# Patient Record
Sex: FEMALE | Race: WHITE | Employment: UNEMPLOYED | URBAN - METROPOLITAN AREA
[De-identification: names, ages, dates, MRNs, and addresses within clinical notes are randomized per-mention and may not be internally consistent; named-entity substitution may affect disease eponyms.]

---

## 2018-10-24 ENCOUNTER — HOSPITAL ENCOUNTER (OUTPATIENT)
Age: 63
Discharge: HOME | DRG: 001 | End: 2018-10-24
Payer: MEDICARE

## 2018-10-24 LAB
ANION GAP SERPL CALCULATED.3IONS-SCNC: 13 MMOL/L (ref 4–16)
APTT: 29.6 SECONDS (ref 21.2–33)
BASOPHILS ABSOLUTE: 0.1 K/CU MM
BASOPHILS RELATIVE PERCENT: 0.4 % (ref 0–1)
BUN BLDV-MCNC: 25 MG/DL (ref 6–23)
CALCIUM SERPL-MCNC: 8.9 MG/DL (ref 8.3–10.6)
CHLORIDE BLD-SCNC: 98 MMOL/L (ref 99–110)
CO2: 20 MMOL/L (ref 21–32)
CREAT SERPL-MCNC: 0.9 MG/DL (ref 0.6–1.1)
DIFFERENTIAL TYPE: ABNORMAL
EOSINOPHILS ABSOLUTE: 0.1 K/CU MM
EOSINOPHILS RELATIVE PERCENT: 1.2 % (ref 0–3)
GFR AFRICAN AMERICAN: >60 ML/MIN/1.73M2
GFR NON-AFRICAN AMERICAN: >60 ML/MIN/1.73M2
GLUCOSE BLD-MCNC: 422 MG/DL (ref 70–99)
HCT VFR BLD CALC: 32.7 % (ref 37–47)
HEMOGLOBIN: 9.9 GM/DL (ref 12.5–16)
IMMATURE NEUTROPHIL %: 0.3 % (ref 0–0.43)
INR BLD: 0.98 INDEX
LYMPHOCYTES ABSOLUTE: 1.3 K/CU MM
LYMPHOCYTES RELATIVE PERCENT: 11.2 % (ref 24–44)
MCH RBC QN AUTO: 28.2 PG (ref 27–31)
MCHC RBC AUTO-ENTMCNC: 30.3 % (ref 32–36)
MCV RBC AUTO: 93.2 FL (ref 78–100)
MONOCYTES ABSOLUTE: 0.4 K/CU MM
MONOCYTES RELATIVE PERCENT: 3.1 % (ref 0–4)
NUCLEATED RBC %: 0 %
PDW BLD-RTO: 13.7 % (ref 11.7–14.9)
PLATELET # BLD: 295 K/CU MM (ref 140–440)
PMV BLD AUTO: 9.9 FL (ref 7.5–11.1)
POTASSIUM SERPL-SCNC: 5.2 MMOL/L (ref 3.5–5.1)
PROTHROMBIN TIME: 11.2 SECONDS (ref 9.12–12.5)
RBC # BLD: 3.51 M/CU MM (ref 4.2–5.4)
SEGMENTED NEUTROPHILS ABSOLUTE COUNT: 9.6 K/CU MM
SEGMENTED NEUTROPHILS RELATIVE PERCENT: 83.8 % (ref 36–66)
SODIUM BLD-SCNC: 131 MMOL/L (ref 135–145)
TOTAL IMMATURE NEUTOROPHIL: 0.03 K/CU MM
TOTAL NUCLEATED RBC: 0 K/CU MM
WBC # BLD: 11.5 K/CU MM (ref 4–10.5)

## 2018-10-24 PROCEDURE — 85025 COMPLETE CBC W/AUTO DIFF WBC: CPT

## 2018-10-24 PROCEDURE — 85610 PROTHROMBIN TIME: CPT

## 2018-10-24 PROCEDURE — 36415 COLL VENOUS BLD VENIPUNCTURE: CPT

## 2018-10-24 PROCEDURE — 85730 THROMBOPLASTIN TIME PARTIAL: CPT

## 2018-10-24 PROCEDURE — 80048 BASIC METABOLIC PNL TOTAL CA: CPT

## 2018-10-25 ENCOUNTER — APPOINTMENT (OUTPATIENT)
Dept: ULTRASOUND IMAGING | Age: 63
DRG: 001 | End: 2018-10-25
Attending: INTERNAL MEDICINE
Payer: MEDICARE

## 2018-10-25 ENCOUNTER — APPOINTMENT (OUTPATIENT)
Dept: GENERAL RADIOLOGY | Age: 63
DRG: 001 | End: 2018-10-25
Attending: INTERNAL MEDICINE
Payer: MEDICARE

## 2018-10-25 PROBLEM — I25.10 CAD IN NATIVE ARTERY: Status: ACTIVE | Noted: 2018-10-25

## 2018-10-25 PROBLEM — I25.10 3-VESSEL CAD: Status: ACTIVE | Noted: 2018-10-25

## 2018-10-25 PROCEDURE — 93880 EXTRACRANIAL BILAT STUDY: CPT

## 2018-10-25 PROCEDURE — 71045 X-RAY EXAM CHEST 1 VIEW: CPT

## 2018-10-25 PROCEDURE — 93971 EXTREMITY STUDY: CPT

## 2018-10-26 ENCOUNTER — APPOINTMENT (OUTPATIENT)
Dept: GENERAL RADIOLOGY | Age: 63
DRG: 001 | End: 2018-10-26
Attending: INTERNAL MEDICINE
Payer: MEDICARE

## 2018-10-26 ENCOUNTER — APPOINTMENT (OUTPATIENT)
Dept: CT IMAGING | Age: 63
DRG: 001 | End: 2018-10-26
Attending: INTERNAL MEDICINE
Payer: MEDICARE

## 2018-10-26 PROCEDURE — 71250 CT THORAX DX C-: CPT

## 2018-10-26 PROCEDURE — 71045 X-RAY EXAM CHEST 1 VIEW: CPT

## 2018-10-28 ENCOUNTER — APPOINTMENT (OUTPATIENT)
Dept: GENERAL RADIOLOGY | Age: 63
DRG: 001 | End: 2018-10-28
Attending: INTERNAL MEDICINE
Payer: MEDICARE

## 2018-10-28 ENCOUNTER — ANESTHESIA EVENT (OUTPATIENT)
Dept: OPERATING ROOM | Age: 63
DRG: 001 | End: 2018-10-28
Payer: MEDICARE

## 2018-10-28 PROCEDURE — 71045 X-RAY EXAM CHEST 1 VIEW: CPT

## 2018-10-28 NOTE — ANESTHESIA PRE PROCEDURE
10/28/18 [address] rectal enema 1 enema  1 enema Rectal Once Angel Cruz MD        insulin lispro (HUMALOG) injection vial 0-6 Units  0-6 Units Subcutaneous TID WC Angel Cruz MD   Stopped at 10/28/18 1647    linagliptin (TRADJENTA) tablet 5 mg  5 mg Oral Daily Jovnaa Cabello MD   5 mg at 10/28/18 6107    insulin glargine (LANTUS) injection vial 20 Units  20 Units Subcutaneous Nightly Jovana Cabello MD   20 Units at 10/27/18 2131    insulin lispro (HUMALOG) injection vial 0-6 Units  0-6 Units Subcutaneous Nightly Jovana Cabello MD   3 Units at 10/27/18 2130       Allergies:  No Known Allergies    Problem List:    Patient Active Problem List   Diagnosis Code    Confusion with non-focal neuro exam R41.0    Diabetes mellitus ([facility] E11.9    Abscess, jaw M27.2    Hyperlipidemia E78.5    Hypertension I10    Neuropathy in diabetes ([facility] E11.40    Speech abnormality R47.9    Depression F32.9    Anxiety F41.9    TIA (transient ischemic attack) G45.9    3-vessel CAD I25.10    CAD in native artery I25.10       Past Medical History:        Diagnosis Date    Diabetes mellitus ([facility]     Hypertension     TIA (transient ischemic attack)        Past Surgical History:        Procedure Laterality Date    HYSTERECTOMY      TUMOR EXCISION      abdomen       Social History:    Social History   Substance Use Topics    Smoking status: Never Smoker    Smokeless tobacco: Not on file    Alcohol use No                                Counseling given: Not Answered      Vital Signs (Current):   Vitals:    10/28/18 1602 10/28/18 1702 10/28/18 1802 10/28/18 1821   BP: 99/68 110/77 (!) 97/59    Pulse: 98 99 98 96   Resp: 19 20 17 21   Temp: 36.7 °C (98.1 °F)      TempSrc:       SpO2: 94% 94% 92% 90%   Weight:       Height:                                                  BP Readings from Last 3 Encounters:   10/28/18 (!) 97/59   09/15/16 109/67   12/11/15 119/63       NPO Status: Time of last

## 2018-10-29 ENCOUNTER — APPOINTMENT (OUTPATIENT)
Dept: GENERAL RADIOLOGY | Age: 63
DRG: 001 | End: 2018-10-29
Attending: INTERNAL MEDICINE
Payer: MEDICARE

## 2018-10-29 ENCOUNTER — ANESTHESIA (OUTPATIENT)
Dept: OPERATING ROOM | Age: 63
DRG: 001 | End: 2018-10-29
Payer: MEDICARE

## 2018-10-29 VITALS
OXYGEN SATURATION: 100 % | DIASTOLIC BLOOD PRESSURE: 74 MMHG | RESPIRATION RATE: 12 BRPM | SYSTOLIC BLOOD PRESSURE: 109 MMHG

## 2018-10-29 PROCEDURE — 36556 INSERT NON-TUNNEL CV CATH: CPT | Performed by: ANESTHESIOLOGY

## 2018-10-29 PROCEDURE — 71045 X-RAY EXAM CHEST 1 VIEW: CPT

## 2018-10-29 PROCEDURE — 2580000003 HC RX 258: Performed by: NURSE ANESTHETIST, CERTIFIED REGISTERED

## 2018-10-29 PROCEDURE — 2500000003 HC RX 250 WO HCPCS: Performed by: SURGERY

## 2018-10-29 PROCEDURE — 36620 INSERTION CATHETER ARTERY: CPT | Performed by: ANESTHESIOLOGY

## 2018-10-29 PROCEDURE — P9045 ALBUMIN (HUMAN), 5%, 250 ML: HCPCS | Performed by: NURSE ANESTHETIST, CERTIFIED REGISTERED

## 2018-10-29 PROCEDURE — 6360000002 HC RX W HCPCS: Performed by: NURSE ANESTHETIST, CERTIFIED REGISTERED

## 2018-10-29 PROCEDURE — 2500000003 HC RX 250 WO HCPCS: Performed by: NURSE ANESTHETIST, CERTIFIED REGISTERED

## 2018-10-29 PROCEDURE — 2580000003 HC RX 258: Performed by: SURGERY

## 2018-10-29 PROCEDURE — 93503 INSERT/PLACE HEART CATHETER: CPT | Performed by: NURSE ANESTHETIST, CERTIFIED REGISTERED

## 2018-10-29 PROCEDURE — 6370000000 HC RX 637 (ALT 250 FOR IP): Performed by: SURGERY

## 2018-10-29 RX ORDER — SUCCINYLCHOLINE CHLORIDE 20 MG/ML
INJECTION INTRAMUSCULAR; INTRAVENOUS PRN
Status: DISCONTINUED | OUTPATIENT
Start: 2018-10-29 | End: 2018-10-29 | Stop reason: SDUPTHER

## 2018-10-29 RX ORDER — EPINEPHRINE 1 MG/ML
INJECTION, SOLUTION, CONCENTRATE INTRAVENOUS PRN
Status: DISCONTINUED | OUTPATIENT
Start: 2018-10-29 | End: 2018-10-29 | Stop reason: SDUPTHER

## 2018-10-29 RX ORDER — HEPARIN SODIUM 10000 [USP'U]/ML
INJECTION, SOLUTION INTRAVENOUS; SUBCUTANEOUS PRN
Status: DISCONTINUED | OUTPATIENT
Start: 2018-10-29 | End: 2018-10-29 | Stop reason: SDUPTHER

## 2018-10-29 RX ORDER — PROTAMINE SULFATE 10 MG/ML
INJECTION, SOLUTION INTRAVENOUS PRN
Status: DISCONTINUED | OUTPATIENT
Start: 2018-10-29 | End: 2018-10-29 | Stop reason: SDUPTHER

## 2018-10-29 RX ORDER — DIPHENHYDRAMINE HYDROCHLORIDE 50 MG/ML
INJECTION INTRAMUSCULAR; INTRAVENOUS PRN
Status: DISCONTINUED | OUTPATIENT
Start: 2018-10-29 | End: 2018-10-29 | Stop reason: SDUPTHER

## 2018-10-29 RX ORDER — CEFAZOLIN SODIUM 1 G/3ML
INJECTION, POWDER, FOR SOLUTION INTRAMUSCULAR; INTRAVENOUS PRN
Status: DISCONTINUED | OUTPATIENT
Start: 2018-10-29 | End: 2018-10-29 | Stop reason: SDUPTHER

## 2018-10-29 RX ORDER — SODIUM CHLORIDE, SODIUM LACTATE, POTASSIUM CHLORIDE, CALCIUM CHLORIDE 600; 310; 30; 20 MG/100ML; MG/100ML; MG/100ML; MG/100ML
INJECTION, SOLUTION INTRAVENOUS CONTINUOUS PRN
Status: DISCONTINUED | OUTPATIENT
Start: 2018-10-29 | End: 2018-10-29 | Stop reason: SDUPTHER

## 2018-10-29 RX ORDER — DEXAMETHASONE SODIUM PHOSPHATE 4 MG/ML
INJECTION, SOLUTION INTRA-ARTICULAR; INTRALESIONAL; INTRAMUSCULAR; INTRAVENOUS; SOFT TISSUE PRN
Status: DISCONTINUED | OUTPATIENT
Start: 2018-10-29 | End: 2018-10-29 | Stop reason: SDUPTHER

## 2018-10-29 RX ORDER — CALCIUM CHLORIDE 100 MG/ML
INJECTION INTRAVENOUS; INTRAVENTRICULAR PRN
Status: DISCONTINUED | OUTPATIENT
Start: 2018-10-29 | End: 2018-10-29 | Stop reason: ALTCHOICE

## 2018-10-29 RX ORDER — ALBUMIN, HUMAN INJ 5% 5 %
SOLUTION INTRAVENOUS PRN
Status: DISCONTINUED | OUTPATIENT
Start: 2018-10-29 | End: 2018-10-29 | Stop reason: SDUPTHER

## 2018-10-29 RX ORDER — LIDOCAINE HYDROCHLORIDE 20 MG/ML
INJECTION, SOLUTION INFILTRATION; PERINEURAL PRN
Status: DISCONTINUED | OUTPATIENT
Start: 2018-10-29 | End: 2018-10-29 | Stop reason: SDUPTHER

## 2018-10-29 RX ORDER — HYDROMORPHONE HCL 110MG/55ML
PATIENT CONTROLLED ANALGESIA SYRINGE INTRAVENOUS PRN
Status: DISCONTINUED | OUTPATIENT
Start: 2018-10-29 | End: 2018-10-29 | Stop reason: SDUPTHER

## 2018-10-29 RX ORDER — SODIUM CHLORIDE 9 MG/ML
INJECTION, SOLUTION INTRAVENOUS CONTINUOUS PRN
Status: DISCONTINUED | OUTPATIENT
Start: 2018-10-29 | End: 2018-10-29 | Stop reason: SDUPTHER

## 2018-10-29 RX ORDER — NEOSTIGMINE METHYLSULFATE 5 MG/5 ML
SYRINGE (ML) INTRAVENOUS PRN
Status: DISCONTINUED | OUTPATIENT
Start: 2018-10-29 | End: 2018-10-29 | Stop reason: SDUPTHER

## 2018-10-29 RX ORDER — ONDANSETRON 2 MG/ML
INJECTION INTRAMUSCULAR; INTRAVENOUS PRN
Status: DISCONTINUED | OUTPATIENT
Start: 2018-10-29 | End: 2018-10-29 | Stop reason: SDUPTHER

## 2018-10-29 RX ORDER — PROPOFOL 10 MG/ML
INJECTION, EMULSION INTRAVENOUS PRN
Status: DISCONTINUED | OUTPATIENT
Start: 2018-10-29 | End: 2018-10-29 | Stop reason: SDUPTHER

## 2018-10-29 RX ORDER — SUFENTANIL CITRATE 50 UG/ML
INJECTION EPIDURAL; INTRAVENOUS PRN
Status: DISCONTINUED | OUTPATIENT
Start: 2018-10-29 | End: 2018-10-29 | Stop reason: SDUPTHER

## 2018-10-29 RX ORDER — POTASSIUM CHLORIDE 29.8 MG/ML
INJECTION INTRAVENOUS PRN
Status: DISCONTINUED | OUTPATIENT
Start: 2018-10-29 | End: 2018-10-29 | Stop reason: SDUPTHER

## 2018-10-29 RX ORDER — VASOPRESSIN 20 U/ML
INJECTION PARENTERAL PRN
Status: DISCONTINUED | OUTPATIENT
Start: 2018-10-29 | End: 2018-10-29 | Stop reason: SDUPTHER

## 2018-10-29 RX ORDER — VECURONIUM BROMIDE 1 MG/ML
INJECTION, POWDER, LYOPHILIZED, FOR SOLUTION INTRAVENOUS PRN
Status: DISCONTINUED | OUTPATIENT
Start: 2018-10-29 | End: 2018-10-29 | Stop reason: SDUPTHER

## 2018-10-29 RX ORDER — GLYCOPYRROLATE 0.2 MG/ML
INJECTION INTRAMUSCULAR; INTRAVENOUS PRN
Status: DISCONTINUED | OUTPATIENT
Start: 2018-10-29 | End: 2018-10-29 | Stop reason: SDUPTHER

## 2018-10-29 RX ADMIN — SUFENTANIL CITRATE 10 MCG: 50 INJECTION EPIDURAL; INTRAVENOUS at 07:40

## 2018-10-29 RX ADMIN — EPINEPHRINE 10 MCG: 1 INJECTION, SOLUTION INTRAMUSCULAR; SUBCUTANEOUS at 08:36

## 2018-10-29 RX ADMIN — VASOPRESSIN 1 UNITS: 20 INJECTION INTRAVENOUS at 08:08

## 2018-10-29 RX ADMIN — ONDANSETRON HYDROCHLORIDE 4 MG: 2 SOLUTION INTRAMUSCULAR; INTRAVENOUS at 07:40

## 2018-10-29 RX ADMIN — ALBUMIN (HUMAN) 250 ML: 0.05 SOLUTION INTRAVENOUS at 08:15

## 2018-10-29 RX ADMIN — SUFENTANIL CITRATE 10 MCG: 50 INJECTION EPIDURAL; INTRAVENOUS at 08:30

## 2018-10-29 RX ADMIN — GLYCOPYRROLATE 0.4 MG: 0.2 INJECTION, SOLUTION INTRAMUSCULAR; INTRAVENOUS at 11:32

## 2018-10-29 RX ADMIN — PROPOFOL 30 MG: 10 INJECTION, EMULSION INTRAVENOUS at 07:31

## 2018-10-29 RX ADMIN — PROTAMINE SULFATE 150 MG: 10 INJECTION, SOLUTION INTRAVENOUS at 10:55

## 2018-10-29 RX ADMIN — LIDOCAINE HYDROCHLORIDE 100 MG: 20 INJECTION, SOLUTION INFILTRATION; PERINEURAL at 07:31

## 2018-10-29 RX ADMIN — EPINEPHRINE 10 MCG: 1 INJECTION, SOLUTION INTRAMUSCULAR; SUBCUTANEOUS at 08:28

## 2018-10-29 RX ADMIN — SUFENTANIL CITRATE 40 MCG: 50 INJECTION EPIDURAL; INTRAVENOUS at 07:31

## 2018-10-29 RX ADMIN — PROTAMINE SULFATE 150 MG: 10 INJECTION, SOLUTION INTRAVENOUS at 10:45

## 2018-10-29 RX ADMIN — EPINEPHRINE 10 MCG: 1 INJECTION, SOLUTION INTRAMUSCULAR; SUBCUTANEOUS at 08:26

## 2018-10-29 RX ADMIN — SUFENTANIL CITRATE 10 MCG: 50 INJECTION EPIDURAL; INTRAVENOUS at 08:40

## 2018-10-29 RX ADMIN — VECURONIUM BROMIDE FOR INJECTION 2 MG: 1 INJECTION, POWDER, LYOPHILIZED, FOR SOLUTION INTRAVENOUS at 08:35

## 2018-10-29 RX ADMIN — HEPARIN SODIUM 5000 UNITS: 10000 INJECTION, SOLUTION INTRAVENOUS; SUBCUTANEOUS at 08:36

## 2018-10-29 RX ADMIN — Medication 50 MEQ: at 11:40

## 2018-10-29 RX ADMIN — POTASSIUM CHLORIDE 40 MEQ: 400 INJECTION, SOLUTION INTRAVENOUS at 11:15

## 2018-10-29 RX ADMIN — EPINEPHRINE 10 MCG: 1 INJECTION, SOLUTION INTRAMUSCULAR; SUBCUTANEOUS at 08:38

## 2018-10-29 RX ADMIN — CALCIUM CHLORIDE 1 G: 100 INJECTION, SOLUTION INTRAVENOUS; INTRAVENTRICULAR at 11:30

## 2018-10-29 RX ADMIN — EPINEPHRINE 20 MCG: 1 INJECTION, SOLUTION INTRAMUSCULAR; SUBCUTANEOUS at 07:50

## 2018-10-29 RX ADMIN — ALBUMIN (HUMAN) 250 ML: 0.05 SOLUTION INTRAVENOUS at 09:00

## 2018-10-29 RX ADMIN — SODIUM CHLORIDE 12 UNITS/HR: 9 INJECTION, SOLUTION INTRAVENOUS at 09:08

## 2018-10-29 RX ADMIN — VASOPRESSIN 2 UNITS: 20 INJECTION INTRAVENOUS at 07:45

## 2018-10-29 RX ADMIN — SUFENTANIL CITRATE 10 MCG: 50 INJECTION EPIDURAL; INTRAVENOUS at 08:21

## 2018-10-29 RX ADMIN — DEXAMETHASONE SODIUM PHOSPHATE 12 MG: 4 INJECTION, SOLUTION INTRAMUSCULAR; INTRAVENOUS at 07:40

## 2018-10-29 RX ADMIN — EPINEPHRINE 20 MCG: 1 INJECTION, SOLUTION INTRAMUSCULAR; SUBCUTANEOUS at 07:37

## 2018-10-29 RX ADMIN — HEPARIN SODIUM 25000 UNITS: 10000 INJECTION, SOLUTION INTRAVENOUS; SUBCUTANEOUS at 08:31

## 2018-10-29 RX ADMIN — EPINEPHRINE 20 MCG: 1 INJECTION, SOLUTION INTRAMUSCULAR; SUBCUTANEOUS at 07:45

## 2018-10-29 RX ADMIN — VASOPRESSIN 1 UNITS: 20 INJECTION INTRAVENOUS at 07:55

## 2018-10-29 RX ADMIN — EPINEPHRINE 5 MCG/MIN: 1 INJECTION, SOLUTION, CONCENTRATE INTRAVENOUS at 07:36

## 2018-10-29 RX ADMIN — HYDROMORPHONE HYDROCHLORIDE 0.4 MG: 2 INJECTION INTRAMUSCULAR; INTRAVENOUS; SUBCUTANEOUS at 11:04

## 2018-10-29 RX ADMIN — SUFENTANIL CITRATE 10 MCG: 50 INJECTION EPIDURAL; INTRAVENOUS at 08:15

## 2018-10-29 RX ADMIN — Medication 50 MEQ: at 11:00

## 2018-10-29 RX ADMIN — SODIUM CHLORIDE, POTASSIUM CHLORIDE, SODIUM LACTATE AND CALCIUM CHLORIDE: 600; 310; 30; 20 INJECTION, SOLUTION INTRAVENOUS at 09:05

## 2018-10-29 RX ADMIN — VASOPRESSIN 1 UNITS: 20 INJECTION INTRAVENOUS at 08:28

## 2018-10-29 RX ADMIN — DIPHENHYDRAMINE HYDROCHLORIDE 50 MG: 50 INJECTION INTRAMUSCULAR; INTRAVENOUS at 08:36

## 2018-10-29 RX ADMIN — NOREPINEPHRINE BITARTRATE 5 MCG/MIN: 1 INJECTION, SOLUTION, CONCENTRATE INTRAVENOUS at 10:28

## 2018-10-29 RX ADMIN — SUCCINYLCHOLINE CHLORIDE 200 MG: 20 INJECTION, SOLUTION INTRAMUSCULAR; INTRAVENOUS at 07:32

## 2018-10-29 RX ADMIN — SUFENTANIL CITRATE 10 MCG: 50 INJECTION EPIDURAL; INTRAVENOUS at 08:10

## 2018-10-29 RX ADMIN — CALCIUM CHLORIDE 1 G: 100 INJECTION, SOLUTION INTRAVENOUS; INTRAVENTRICULAR at 11:00

## 2018-10-29 RX ADMIN — SODIUM CHLORIDE: 9 INJECTION, SOLUTION INTRAVENOUS at 06:59

## 2018-10-29 RX ADMIN — HYDROMORPHONE HYDROCHLORIDE 0.6 MG: 2 INJECTION INTRAMUSCULAR; INTRAVENOUS; SUBCUTANEOUS at 11:36

## 2018-10-29 RX ADMIN — GLYCOPYRROLATE 0.2 MG: 0.2 INJECTION, SOLUTION INTRAMUSCULAR; INTRAVENOUS at 11:33

## 2018-10-29 RX ADMIN — VECURONIUM BROMIDE FOR INJECTION 4 MG: 1 INJECTION, POWDER, LYOPHILIZED, FOR SOLUTION INTRAVENOUS at 08:25

## 2018-10-29 RX ADMIN — SODIUM CHLORIDE, POTASSIUM CHLORIDE, SODIUM LACTATE AND CALCIUM CHLORIDE: 600; 310; 30; 20 INJECTION, SOLUTION INTRAVENOUS at 06:59

## 2018-10-29 RX ADMIN — Medication 3 MG: at 11:32

## 2018-10-29 RX ADMIN — EPINEPHRINE 10 MCG: 1 INJECTION, SOLUTION INTRAMUSCULAR; SUBCUTANEOUS at 08:08

## 2018-10-29 RX ADMIN — EPINEPHRINE 10 MCG: 1 INJECTION, SOLUTION INTRAMUSCULAR; SUBCUTANEOUS at 07:55

## 2018-10-29 RX ADMIN — CEFAZOLIN 2000 MG: 1 INJECTION, POWDER, FOR SOLUTION INTRAMUSCULAR; INTRAVENOUS; PARENTERAL at 07:35

## 2018-10-29 RX ADMIN — VECURONIUM BROMIDE FOR INJECTION 4 MG: 1 INJECTION, POWDER, LYOPHILIZED, FOR SOLUTION INTRAVENOUS at 07:35

## 2018-10-29 RX ADMIN — SODIUM CHLORIDE 8 UNITS/HR: 9 INJECTION, SOLUTION INTRAVENOUS at 07:57

## 2018-10-29 ASSESSMENT — PULMONARY FUNCTION TESTS
PIF_VALUE: 0
PIF_VALUE: 24
PIF_VALUE: 24
PIF_VALUE: 0
PIF_VALUE: 0
PIF_VALUE: 30
PIF_VALUE: 3
PIF_VALUE: 0
PIF_VALUE: 58
PIF_VALUE: 0
PIF_VALUE: 29
PIF_VALUE: 26
PIF_VALUE: 0
PIF_VALUE: 30
PIF_VALUE: 20
PIF_VALUE: 25
PIF_VALUE: 30
PIF_VALUE: 27
PIF_VALUE: 0
PIF_VALUE: 30
PIF_VALUE: 30
PIF_VALUE: 0
PIF_VALUE: 0
PIF_VALUE: 29
PIF_VALUE: 0
PIF_VALUE: 24
PIF_VALUE: 25
PIF_VALUE: 22
PIF_VALUE: 29
PIF_VALUE: 27
PIF_VALUE: 0
PIF_VALUE: 0
PIF_VALUE: 29
PIF_VALUE: 22
PIF_VALUE: 22
PIF_VALUE: 26
PIF_VALUE: 0
PIF_VALUE: 24
PIF_VALUE: 22
PIF_VALUE: 27
PIF_VALUE: 0
PIF_VALUE: 2
PIF_VALUE: 27
PIF_VALUE: 0
PIF_VALUE: 26
PIF_VALUE: 23
PIF_VALUE: 0
PIF_VALUE: 0
PIF_VALUE: 29
PIF_VALUE: 26
PIF_VALUE: 28
PIF_VALUE: 0
PIF_VALUE: 24
PIF_VALUE: 23
PIF_VALUE: 0
PIF_VALUE: 29
PIF_VALUE: 0
PIF_VALUE: 27
PIF_VALUE: 0
PIF_VALUE: 24
PIF_VALUE: 0
PIF_VALUE: 30
PIF_VALUE: 0
PIF_VALUE: 28
PIF_VALUE: 0
PIF_VALUE: 0
PIF_VALUE: 29
PIF_VALUE: 0
PIF_VALUE: 27
PIF_VALUE: 0
PIF_VALUE: 29
PIF_VALUE: 23
PIF_VALUE: 0
PIF_VALUE: 3
PIF_VALUE: 0
PIF_VALUE: 28
PIF_VALUE: 30
PIF_VALUE: 23
PIF_VALUE: 22
PIF_VALUE: 22
PIF_VALUE: 0
PIF_VALUE: 25
PIF_VALUE: 0
PIF_VALUE: 21
PIF_VALUE: 32
PIF_VALUE: 28
PIF_VALUE: 0
PIF_VALUE: 4
PIF_VALUE: 0
PIF_VALUE: 0
PIF_VALUE: 29
PIF_VALUE: 0
PIF_VALUE: 23
PIF_VALUE: 0
PIF_VALUE: 22
PIF_VALUE: 0
PIF_VALUE: 25
PIF_VALUE: 25
PIF_VALUE: 27
PIF_VALUE: 24
PIF_VALUE: 0
PIF_VALUE: 4
PIF_VALUE: 24
PIF_VALUE: 0
PIF_VALUE: 0
PIF_VALUE: 25
PIF_VALUE: 0
PIF_VALUE: 0
PIF_VALUE: 29
PIF_VALUE: 26
PIF_VALUE: 0
PIF_VALUE: 0
PIF_VALUE: 23
PIF_VALUE: 25
PIF_VALUE: 25
PIF_VALUE: 29
PIF_VALUE: 0
PIF_VALUE: 0
PIF_VALUE: 26
PIF_VALUE: 24
PIF_VALUE: 0
PIF_VALUE: 0
PIF_VALUE: 29
PIF_VALUE: 0
PIF_VALUE: 26
PIF_VALUE: 25
PIF_VALUE: 1
PIF_VALUE: 28
PIF_VALUE: 0
PIF_VALUE: 2
PIF_VALUE: 0
PIF_VALUE: 26
PIF_VALUE: 24
PIF_VALUE: 30
PIF_VALUE: 0
PIF_VALUE: 22
PIF_VALUE: 28
PIF_VALUE: 29
PIF_VALUE: 35
PIF_VALUE: 31
PIF_VALUE: 1
PIF_VALUE: 23
PIF_VALUE: 28
PIF_VALUE: 30
PIF_VALUE: 0
PIF_VALUE: 28
PIF_VALUE: 0
PIF_VALUE: 26
PIF_VALUE: 0
PIF_VALUE: 26
PIF_VALUE: 23
PIF_VALUE: 26
PIF_VALUE: 0
PIF_VALUE: 23
PIF_VALUE: 26
PIF_VALUE: 26
PIF_VALUE: 23
PIF_VALUE: 26
PIF_VALUE: 27
PIF_VALUE: 28
PIF_VALUE: 29
PIF_VALUE: 24
PIF_VALUE: 0
PIF_VALUE: 2
PIF_VALUE: 0
PIF_VALUE: 24
PIF_VALUE: 28
PIF_VALUE: 28
PIF_VALUE: 0
PIF_VALUE: 30
PIF_VALUE: 0
PIF_VALUE: 12
PIF_VALUE: 28
PIF_VALUE: 12
PIF_VALUE: 0
PIF_VALUE: 7
PIF_VALUE: 0
PIF_VALUE: 27
PIF_VALUE: 0
PIF_VALUE: 1
PIF_VALUE: 29
PIF_VALUE: 0
PIF_VALUE: 24
PIF_VALUE: 31
PIF_VALUE: 7
PIF_VALUE: 22
PIF_VALUE: 27
PIF_VALUE: 0
PIF_VALUE: 22
PIF_VALUE: 0
PIF_VALUE: 26
PIF_VALUE: 0
PIF_VALUE: 0
PIF_VALUE: 26
PIF_VALUE: 0
PIF_VALUE: 26
PIF_VALUE: 0
PIF_VALUE: 8
PIF_VALUE: 0
PIF_VALUE: 26
PIF_VALUE: 0
PIF_VALUE: 0
PIF_VALUE: 26
PIF_VALUE: 0
PIF_VALUE: 26
PIF_VALUE: 25
PIF_VALUE: 28
PIF_VALUE: 24
PIF_VALUE: 1
PIF_VALUE: 0
PIF_VALUE: 29
PIF_VALUE: 0
PIF_VALUE: 25
PIF_VALUE: 27
PIF_VALUE: 27
PIF_VALUE: 0
PIF_VALUE: 0
PIF_VALUE: 21
PIF_VALUE: 0
PIF_VALUE: 0
PIF_VALUE: 21
PIF_VALUE: 0
PIF_VALUE: 25
PIF_VALUE: 30
PIF_VALUE: 10
PIF_VALUE: 24
PIF_VALUE: 0
PIF_VALUE: 50
PIF_VALUE: 24
PIF_VALUE: 0
PIF_VALUE: 28
PIF_VALUE: 0
PIF_VALUE: 25
PIF_VALUE: 23
PIF_VALUE: 26
PIF_VALUE: 0
PIF_VALUE: 24
PIF_VALUE: 29
PIF_VALUE: 26
PIF_VALUE: 29
PIF_VALUE: 27
PIF_VALUE: 2
PIF_VALUE: 0
PIF_VALUE: 10

## 2018-10-30 ENCOUNTER — APPOINTMENT (OUTPATIENT)
Dept: GENERAL RADIOLOGY | Age: 63
DRG: 001 | End: 2018-10-30
Attending: INTERNAL MEDICINE
Payer: MEDICARE

## 2018-10-30 ENCOUNTER — ANESTHESIA (OUTPATIENT)
Dept: OPERATING ROOM | Age: 63
DRG: 001 | End: 2018-10-30
Payer: MEDICARE

## 2018-10-30 ENCOUNTER — ANESTHESIA EVENT (OUTPATIENT)
Dept: OPERATING ROOM | Age: 63
DRG: 001 | End: 2018-10-30
Payer: MEDICARE

## 2018-10-30 VITALS
DIASTOLIC BLOOD PRESSURE: 13 MMHG | TEMPERATURE: 97.7 F | RESPIRATION RATE: 10 BRPM | SYSTOLIC BLOOD PRESSURE: 74 MMHG | OXYGEN SATURATION: 93 %

## 2018-10-30 PROCEDURE — 2580000003 HC RX 258: Performed by: NURSE ANESTHETIST, CERTIFIED REGISTERED

## 2018-10-30 PROCEDURE — 6360000002 HC RX W HCPCS: Performed by: NURSE ANESTHETIST, CERTIFIED REGISTERED

## 2018-10-30 PROCEDURE — 6370000000 HC RX 637 (ALT 250 FOR IP): Performed by: NURSE ANESTHETIST, CERTIFIED REGISTERED

## 2018-10-30 PROCEDURE — 76000 FLUOROSCOPY <1 HR PHYS/QHP: CPT

## 2018-10-30 PROCEDURE — 71045 X-RAY EXAM CHEST 1 VIEW: CPT

## 2018-10-30 PROCEDURE — 6360000002 HC RX W HCPCS: Performed by: THORACIC SURGERY (CARDIOTHORACIC VASCULAR SURGERY)

## 2018-10-30 PROCEDURE — 2500000003 HC RX 250 WO HCPCS: Performed by: NURSE ANESTHETIST, CERTIFIED REGISTERED

## 2018-10-30 RX ORDER — VECURONIUM BROMIDE 1 MG/ML
INJECTION, POWDER, LYOPHILIZED, FOR SOLUTION INTRAVENOUS PRN
Status: DISCONTINUED | OUTPATIENT
Start: 2018-10-30 | End: 2018-10-30 | Stop reason: SDUPTHER

## 2018-10-30 RX ORDER — EPINEPHRINE 1 MG/ML
INJECTION, SOLUTION, CONCENTRATE INTRAVENOUS PRN
Status: DISCONTINUED | OUTPATIENT
Start: 2018-10-30 | End: 2018-10-30 | Stop reason: SDUPTHER

## 2018-10-30 RX ORDER — SODIUM CHLORIDE 9 MG/ML
INJECTION, SOLUTION INTRAVENOUS CONTINUOUS PRN
Status: DISCONTINUED | OUTPATIENT
Start: 2018-10-30 | End: 2018-10-30 | Stop reason: SDUPTHER

## 2018-10-30 RX ORDER — DIPHENHYDRAMINE HYDROCHLORIDE 50 MG/ML
INJECTION INTRAMUSCULAR; INTRAVENOUS PRN
Status: DISCONTINUED | OUTPATIENT
Start: 2018-10-30 | End: 2018-10-30 | Stop reason: SDUPTHER

## 2018-10-30 RX ORDER — CEFAZOLIN SODIUM 1 G/3ML
INJECTION, POWDER, FOR SOLUTION INTRAMUSCULAR; INTRAVENOUS PRN
Status: DISCONTINUED | OUTPATIENT
Start: 2018-10-30 | End: 2018-10-30 | Stop reason: SDUPTHER

## 2018-10-30 RX ORDER — HEPARIN SODIUM 1000 [USP'U]/ML
INJECTION, SOLUTION INTRAVENOUS; SUBCUTANEOUS PRN
Status: DISCONTINUED | OUTPATIENT
Start: 2018-10-30 | End: 2018-10-30 | Stop reason: SDUPTHER

## 2018-10-30 RX ORDER — DEXAMETHASONE SODIUM PHOSPHATE 4 MG/ML
INJECTION, SOLUTION INTRA-ARTICULAR; INTRALESIONAL; INTRAMUSCULAR; INTRAVENOUS; SOFT TISSUE PRN
Status: DISCONTINUED | OUTPATIENT
Start: 2018-10-30 | End: 2018-10-30 | Stop reason: SDUPTHER

## 2018-10-30 RX ORDER — EPINEPHRINE 1 MG/ML
INJECTION, SOLUTION, CONCENTRATE INTRAVENOUS PRN
Status: DISCONTINUED | OUTPATIENT
Start: 2018-10-30 | End: 2018-10-30

## 2018-10-30 RX ORDER — DEXTROSE MONOHYDRATE 25 G/50ML
INJECTION, SOLUTION INTRAVENOUS PRN
Status: DISCONTINUED | OUTPATIENT
Start: 2018-10-30 | End: 2018-10-30 | Stop reason: SDUPTHER

## 2018-10-30 RX ADMIN — CEFAZOLIN 2 MG: 1 INJECTION, POWDER, FOR SOLUTION INTRAMUSCULAR; INTRAVENOUS; PARENTERAL at 11:59

## 2018-10-30 RX ADMIN — VASOPRESSIN 0.04 UNITS/MIN: 20 INJECTION INTRAVENOUS at 12:41

## 2018-10-30 RX ADMIN — EPINEPHRINE 10 MCG: 1 INJECTION, SOLUTION INTRAMUSCULAR; SUBCUTANEOUS at 12:39

## 2018-10-30 RX ADMIN — EPINEPHRINE 10 MCG: 1 INJECTION, SOLUTION INTRAMUSCULAR; SUBCUTANEOUS at 13:27

## 2018-10-30 RX ADMIN — EPINEPHRINE 10 MCG: 1 INJECTION, SOLUTION INTRAMUSCULAR; SUBCUTANEOUS at 13:25

## 2018-10-30 RX ADMIN — DEXAMETHASONE SODIUM PHOSPHATE 8 MG: 4 INJECTION, SOLUTION INTRAMUSCULAR; INTRAVENOUS at 12:34

## 2018-10-30 RX ADMIN — DEXTROSE MONOHYDRATE 12.5 G: 25 INJECTION, SOLUTION INTRAVENOUS at 12:05

## 2018-10-30 RX ADMIN — HEPARIN SODIUM 10000 UNITS: 1000 INJECTION, SOLUTION INTRAVENOUS; SUBCUTANEOUS at 12:34

## 2018-10-30 RX ADMIN — INSULIN HUMAN 2 UNITS: 100 INJECTION, SOLUTION PARENTERAL at 12:34

## 2018-10-30 RX ADMIN — MILRINONE LACTATE 0.25 MCG/KG/MIN: 200 INJECTION, SOLUTION INTRAVENOUS at 14:30

## 2018-10-30 RX ADMIN — DIPHENHYDRAMINE HYDROCHLORIDE 25 MG: 50 INJECTION INTRAMUSCULAR; INTRAVENOUS at 12:34

## 2018-10-30 RX ADMIN — EPINEPHRINE 10 MCG: 1 INJECTION, SOLUTION INTRAMUSCULAR; SUBCUTANEOUS at 12:29

## 2018-10-30 RX ADMIN — INSULIN HUMAN 10 UNITS: 100 INJECTION, SOLUTION PARENTERAL at 12:05

## 2018-10-30 RX ADMIN — VECURONIUM BROMIDE FOR INJECTION 10 MG: 1 INJECTION, POWDER, LYOPHILIZED, FOR SOLUTION INTRAVENOUS at 11:42

## 2018-10-30 RX ADMIN — Medication 1000 MEQ: at 12:34

## 2018-10-30 RX ADMIN — SODIUM CHLORIDE: 9 INJECTION, SOLUTION INTRAVENOUS at 11:14

## 2018-10-30 RX ADMIN — EPINEPHRINE 10 MCG: 1 INJECTION, SOLUTION INTRAMUSCULAR; SUBCUTANEOUS at 12:49

## 2018-10-30 ASSESSMENT — PULMONARY FUNCTION TESTS
PIF_VALUE: 28
PIF_VALUE: 30
PIF_VALUE: 28
PIF_VALUE: 29
PIF_VALUE: 30
PIF_VALUE: 30
PIF_VALUE: 29
PIF_VALUE: 29
PIF_VALUE: 28
PIF_VALUE: 28
PIF_VALUE: 29
PIF_VALUE: 30
PIF_VALUE: 29
PIF_VALUE: 30
PIF_VALUE: 29
PIF_VALUE: 30
PIF_VALUE: 29
PIF_VALUE: 1
PIF_VALUE: 29
PIF_VALUE: 29
PIF_VALUE: 28
PIF_VALUE: 30
PIF_VALUE: 28
PIF_VALUE: 29
PIF_VALUE: 30
PIF_VALUE: 29
PIF_VALUE: 26
PIF_VALUE: 30
PIF_VALUE: 30
PIF_VALUE: 26
PIF_VALUE: 30
PIF_VALUE: 29
PIF_VALUE: 29
PIF_VALUE: 30
PIF_VALUE: 27
PIF_VALUE: 29
PIF_VALUE: 27
PIF_VALUE: 26
PIF_VALUE: 29
PIF_VALUE: 30
PIF_VALUE: 29
PIF_VALUE: 29
PIF_VALUE: 31
PIF_VALUE: 29
PIF_VALUE: 29
PIF_VALUE: 30
PIF_VALUE: 29
PIF_VALUE: 29
PIF_VALUE: 28
PIF_VALUE: 28
PIF_VALUE: 29
PIF_VALUE: 28
PIF_VALUE: 28
PIF_VALUE: 30
PIF_VALUE: 29
PIF_VALUE: 29
PIF_VALUE: 30
PIF_VALUE: 29
PIF_VALUE: 0
PIF_VALUE: 28
PIF_VALUE: 0
PIF_VALUE: 30
PIF_VALUE: 29
PIF_VALUE: 29
PIF_VALUE: 30
PIF_VALUE: 29
PIF_VALUE: 28
PIF_VALUE: 27
PIF_VALUE: 28
PIF_VALUE: 29
PIF_VALUE: 29
PIF_VALUE: 30
PIF_VALUE: 29
PIF_VALUE: 29
PIF_VALUE: 30
PIF_VALUE: 29
PIF_VALUE: 29
PIF_VALUE: 30
PIF_VALUE: 30
PIF_VALUE: 29
PIF_VALUE: 28
PIF_VALUE: 30
PIF_VALUE: 29
PIF_VALUE: 27
PIF_VALUE: 29
PIF_VALUE: 28
PIF_VALUE: 29
PIF_VALUE: 28
PIF_VALUE: 26
PIF_VALUE: 30
PIF_VALUE: 29
PIF_VALUE: 29
PIF_VALUE: 30
PIF_VALUE: 0
PIF_VALUE: 28
PIF_VALUE: 29
PIF_VALUE: 30
PIF_VALUE: 27
PIF_VALUE: 29
PIF_VALUE: 28
PIF_VALUE: 29
PIF_VALUE: 31
PIF_VALUE: 29
PIF_VALUE: 30
PIF_VALUE: 28
PIF_VALUE: 29
PIF_VALUE: 27
PIF_VALUE: 30
PIF_VALUE: 29
PIF_VALUE: 28
PIF_VALUE: 27
PIF_VALUE: 29
PIF_VALUE: 27
PIF_VALUE: 28
PIF_VALUE: 30
PIF_VALUE: 29
PIF_VALUE: 28
PIF_VALUE: 30
PIF_VALUE: 29
PIF_VALUE: 30
PIF_VALUE: 28
PIF_VALUE: 30
PIF_VALUE: 7
PIF_VALUE: 29
PIF_VALUE: 30
PIF_VALUE: 25
PIF_VALUE: 28
PIF_VALUE: 28
PIF_VALUE: 29
PIF_VALUE: 31
PIF_VALUE: 30
PIF_VALUE: 29
PIF_VALUE: 29
PIF_VALUE: 3
PIF_VALUE: 30
PIF_VALUE: 30
PIF_VALUE: 29
PIF_VALUE: 28
PIF_VALUE: 29
PIF_VALUE: 0
PIF_VALUE: 29
PIF_VALUE: 29
PIF_VALUE: 28
PIF_VALUE: 30
PIF_VALUE: 29
PIF_VALUE: 29
PIF_VALUE: 30
PIF_VALUE: 28
PIF_VALUE: 30
PIF_VALUE: 30
PIF_VALUE: 28
PIF_VALUE: 30
PIF_VALUE: 29
PIF_VALUE: 30
PIF_VALUE: 29
PIF_VALUE: 27
PIF_VALUE: 1
PIF_VALUE: 28
PIF_VALUE: 28
PIF_VALUE: 0
PIF_VALUE: 28
PIF_VALUE: 30
PIF_VALUE: 29
PIF_VALUE: 30
PIF_VALUE: 29
PIF_VALUE: 30
PIF_VALUE: 0
PIF_VALUE: 30
PIF_VALUE: 29
PIF_VALUE: 29
PIF_VALUE: 28
PIF_VALUE: 28
PIF_VALUE: 29
PIF_VALUE: 29
PIF_VALUE: 30
PIF_VALUE: 30
PIF_VALUE: 27
PIF_VALUE: 29

## 2018-10-30 NOTE — ANESTHESIA PRE PROCEDURE
mcg/min at 10/30/18 0612    EPINEPHrine (EPINEPHrine HCL) 5 mg in dextrose 5 % 250 mL infusion  1 mcg/min Intravenous Continuous PRN Marco A Gonsales PA-C 2,199 mL/hr at 10/30/18 0639 10 mcg/kg/min at 10/30/18 0639    nitroGLYCERIN 50 mg in dextrose 5% 250 mL infusion  10 mcg/min Intravenous Continuous PRN Marco A Gonsales PA-C        insulin regular (HUMULIN R;NOVOLIN R) 100 Units in sodium chloride 0.9 % 100 mL infusion  1 Units/hr Intravenous Continuous Marco A Gonsales PA-C   Stopped at 10/29/18 1404    glucose (GLUTOSE) 40 % oral gel 15 g  15 g Oral PRN Marco A Gonsales PA-C        dextrose 50 % solution 12.5 g  12.5 g Intravenous PRN Marco A Gonsales PA-C   12.5 g at 10/29/18 1443    glucagon (rDNA) injection 1 mg  1 mg Intramuscular PRN Marco A Gonsales PA-C        dextrose 5 % solution  100 mL/hr Intravenous PRN Marco A Gonsales PA-C        ipratropium-albuterol (DUONEB) nebulizer solution 1 ampule  1 ampule Inhalation Q4H WA Marco A Gonsales PA-C   1 ampule at 10/29/18 1802    [START ON 11/1/2018] amiodarone (CORDARONE) tablet 200 mg  200 mg Oral Daily Angel Schulz MD        sodium chloride flush 0.9 % injection 10 mL  10 mL Intravenous 2 times per day Lucas Silver MD   10 mL at 10/29/18 2317    sodium chloride flush 0.9 % injection 10 mL  10 mL Intravenous PRN Lucas Silver MD        amiodarone (CORDARONE) 450 mg in dextrose 5 % 250 mL infusion  0.5 mg/min Intravenous Continuous Angel Schulz MD 16.7 mL/hr at 10/30/18 0812 0.5 mg/min at 10/30/18 0812    0.9 % sodium chloride bolus  250 mL Intravenous Once Deven Rolle MD 20 mL/hr at 10/29/18 2226 250 mL at 10/29/18 2226    heparin (porcine) in sodium chloride 0.9 % 500 mL infusion   Intravenous Continuous Angel Schulz MD        venlafaxine (EFFEXOR XR) extended release capsule 150 mg  150 mg Oral Nightly Yesenia Cox MD   150 mg at 10/28/18 2128    venlafaxine (EFFEXOR XR) extended release capsule 75 mg  75 mg Oral TALIA Ashu MD Tristan   75 mg at 10/28/18 3243       Allergies:  No Known Allergies    Problem List:    Patient Active Problem List   Diagnosis Code    Confusion with non-focal neuro exam R41.0    Diabetes mellitus ([facility] E11.9    Abscess, jaw M27.2    Hyperlipidemia E78.5    Hypertension I10    Neuropathy in diabetes ([facility] E11.40    Speech abnormality R47.9    Depression F32.9    Anxiety F41.9    TIA (transient ischemic attack) G45.9    3-vessel CAD I25.10    CAD in native artery I25.10       Past Medical History:        Diagnosis Date    Diabetes mellitus ([facility]     Hypertension     TIA (transient ischemic attack)        Past Surgical History:        Procedure Laterality Date    CORONARY ARTERY BYPASS GRAFT  10/29/2018    CABGx4, LIMA-LAD, SVG-OM, SVG-Ramus, SVG-RCA    HYSTERECTOMY      AL OFFICE/OUTPT VISIT,PROCEDURE ONLY N/A 10/29/2018    CABG CORONARY ARTERY BYPASS UTILIZING ENDOSCOPICALLY HARVESTED LEFT SAPHENOUS VEIN FOR 4 GRAFTS WITH INTRA-OPEREATIVE GIORGI AND INSERTION OF INTRA-AORTIC BALLOON PUMP performed by Jordan Kong MD at [address] [facility]       Social History:    Social History   Substance Use Topics    Smoking status: Never Smoker    Smokeless tobacco: Not on file    Alcohol use No                                Counseling given: Not Answered      Vital Signs (Current):   Vitals:    10/30/18 0848 10/30/18 0903 10/30/18 0908 10/30/18 0918   BP: 92/71 (!) 99/54  (!) 107/53   Pulse: 80 84 83 83   Resp: 14 14 14 15   Temp:  98.1 °F (36.7 °C)     TempSrc:  Core     SpO2: 98% 98% 98% 100%   Weight:       Height:                                                  BP Readings from Last 3 Encounters:   10/30/18 (!) 107/53   10/29/18 109/74   09/15/16 109/67       NPO Status: Time of last liquid consumption: 1900                        Time of last solid consumption: 1900                        Date of last liquid consumption: 10/24/18                        Date of last solid food consumption: 10/24/18    BMI:   Wt Readings from Last 3 Encounters:   10/30/18 187 lb 13.3 oz (85.2 kg)   09/15/16 163 lb 9.3 oz (74.2 kg)   12/11/15 150 lb (68 kg)     Body mass index is 33.27 kg/m².     CBC:   Lab Results   Component Value Date    WBC 13.8 10/30/2018    RBC 3.09 10/30/2018    HGB 8.7 10/30/2018    HCT 26.0 10/30/2018    MCV 91.6 10/30/2018    RDW 15.0 10/30/2018     10/30/2018       CMP:   Lab Results   Component Value Date     10/30/2018    K 5.7 10/30/2018     10/30/2018    CO2 22 10/30/2018    BUN 37 10/30/2018    CREATININE 1.5 10/30/2018    CREATININE 1.5 10/30/2018    GFRAA 42 10/30/2018    LABGLOM 35 10/30/2018    GLUCOSE 219 10/30/2018    PROT 4.4 10/30/2018    PROT 6.3 11/28/2012    CALCIUM 9.5 10/30/2018    BILITOT 1.3 10/30/2018    ALKPHOS 31 10/30/2018     10/30/2018     10/30/2018       POC Tests:   Recent Labs      10/30/18   0858   POCGLU  80   POCCL  107       Coags:   Lab Results   Component Value Date    PROTIME 23.5 10/30/2018    INR 2.08 10/30/2018    APTT 72.4 10/30/2018       HCG (If Applicable): No results found for: PREGTESTUR, PREGSERUM, HCG, HCGQUANT     ABGs:   Lab Results   Component Value Date    PO2ART 74.6 10/30/2018    XSG5FRH 34.5 10/30/2018    SYV4OFE 20.6 10/30/2018        Type & Screen (If Applicable):  No results found for: LABABO, [address]    Anesthesia Evaluation  Patient summary reviewed and Nursing notes reviewed no history of anesthetic complications:   Airway:        Comment: ETT in situ   Dental:          Pulmonary:Negative Pulmonary ROS                              Cardiovascular:  Exercise tolerance: no interval change,   (+) hypertension:, CAD:, CABG/stent (POD #1 for CABG):, hyperlipidemia         Beta Blocker:  No for medical reason      ROS comment: POD #1 from CABG  Supported by numerous pressors  Suspected imminent left ventricular failure  IABP in place     Neuro/Psych:   (+) neuromuscular disease (diabetic neuropathy):, TIA, depression/anxiety             GI/Hepatic/Renal: Neg GI/Hepatic/Renal ROS            Endo/Other:    (+) DiabetesType II DM, poorly controlled, , .                 Abdominal:           Vascular: negative vascular ROS. Anesthesia Plan      general     ASA 5 - emergent       Induction: intravenous. Anesthetic plan and risks discussed with child/children.                       Don Salazar DO   10/30/2018

## 2018-10-31 ENCOUNTER — APPOINTMENT (OUTPATIENT)
Dept: GENERAL RADIOLOGY | Age: 63
DRG: 001 | End: 2018-10-31
Attending: INTERNAL MEDICINE
Payer: MEDICARE

## 2018-10-31 PROCEDURE — 71045 X-RAY EXAM CHEST 1 VIEW: CPT

## 2018-11-01 ENCOUNTER — APPOINTMENT (OUTPATIENT)
Dept: GENERAL RADIOLOGY | Age: 63
DRG: 001 | End: 2018-11-01
Attending: INTERNAL MEDICINE
Payer: MEDICARE

## 2018-11-01 PROCEDURE — 71045 X-RAY EXAM CHEST 1 VIEW: CPT

## 2018-11-02 ENCOUNTER — APPOINTMENT (OUTPATIENT)
Dept: GENERAL RADIOLOGY | Age: 63
DRG: 001 | End: 2018-11-02
Attending: INTERNAL MEDICINE
Payer: MEDICARE

## 2018-11-02 PROCEDURE — 71045 X-RAY EXAM CHEST 1 VIEW: CPT

## 2018-11-03 ENCOUNTER — APPOINTMENT (OUTPATIENT)
Dept: GENERAL RADIOLOGY | Age: 63
DRG: 001 | End: 2018-11-03
Attending: INTERNAL MEDICINE
Payer: MEDICARE

## 2018-11-03 PROCEDURE — 71045 X-RAY EXAM CHEST 1 VIEW: CPT

## 2018-11-04 ENCOUNTER — APPOINTMENT (OUTPATIENT)
Dept: GENERAL RADIOLOGY | Age: 63
DRG: 001 | End: 2018-11-04
Attending: INTERNAL MEDICINE
Payer: MEDICARE

## 2018-11-04 PROCEDURE — 71045 X-RAY EXAM CHEST 1 VIEW: CPT

## 2018-11-05 ENCOUNTER — APPOINTMENT (OUTPATIENT)
Dept: INTERVENTIONAL RADIOLOGY/VASCULAR | Age: 63
DRG: 001 | End: 2018-11-05
Attending: INTERNAL MEDICINE
Payer: MEDICARE

## 2018-11-05 ENCOUNTER — APPOINTMENT (OUTPATIENT)
Dept: GENERAL RADIOLOGY | Age: 63
DRG: 001 | End: 2018-11-05
Attending: INTERNAL MEDICINE
Payer: MEDICARE

## 2018-11-05 ENCOUNTER — ANESTHESIA (OUTPATIENT)
Dept: OPERATING ROOM | Age: 63
DRG: 001 | End: 2018-11-05
Payer: MEDICARE

## 2018-11-05 ENCOUNTER — ANESTHESIA EVENT (OUTPATIENT)
Dept: OPERATING ROOM | Age: 63
DRG: 001 | End: 2018-11-05
Payer: MEDICARE

## 2018-11-05 VITALS — RESPIRATION RATE: 1 BRPM | TEMPERATURE: 98.6 F | OXYGEN SATURATION: 100 %

## 2018-11-05 PROBLEM — R57.0 CARDIOGENIC SHOCK (HCC): Status: ACTIVE | Noted: 2018-11-05

## 2018-11-05 PROBLEM — J15.1 PNEUMONIA DUE TO PSEUDOMONAS AERUGINOSA (HCC): Status: ACTIVE | Noted: 2018-11-05

## 2018-11-05 PROBLEM — J96.01 ACUTE RESPIRATORY FAILURE WITH HYPOXIA (HCC): Status: ACTIVE | Noted: 2018-11-05

## 2018-11-05 PROBLEM — N17.9 AKI (ACUTE KIDNEY INJURY) (HCC): Status: ACTIVE | Noted: 2018-11-05

## 2018-11-05 PROCEDURE — 71045 X-RAY EXAM CHEST 1 VIEW: CPT

## 2018-11-05 PROCEDURE — 2580000003 HC RX 258: Performed by: NURSE ANESTHETIST, CERTIFIED REGISTERED

## 2018-11-05 PROCEDURE — 2500000003 HC RX 250 WO HCPCS: Performed by: NURSE ANESTHETIST, CERTIFIED REGISTERED

## 2018-11-05 PROCEDURE — 6360000002 HC RX W HCPCS: Performed by: NURSE ANESTHETIST, CERTIFIED REGISTERED

## 2018-11-05 PROCEDURE — 6370000000 HC RX 637 (ALT 250 FOR IP): Performed by: NURSE ANESTHETIST, CERTIFIED REGISTERED

## 2018-11-05 RX ORDER — MIDAZOLAM HYDROCHLORIDE 1 MG/ML
INJECTION INTRAMUSCULAR; INTRAVENOUS PRN
Status: DISCONTINUED | OUTPATIENT
Start: 2018-11-05 | End: 2018-11-05 | Stop reason: SDUPTHER

## 2018-11-05 RX ORDER — VECURONIUM BROMIDE 1 MG/ML
INJECTION, POWDER, LYOPHILIZED, FOR SOLUTION INTRAVENOUS PRN
Status: DISCONTINUED | OUTPATIENT
Start: 2018-11-05 | End: 2018-11-05 | Stop reason: SDUPTHER

## 2018-11-05 RX ORDER — SODIUM CHLORIDE, SODIUM LACTATE, POTASSIUM CHLORIDE, CALCIUM CHLORIDE 600; 310; 30; 20 MG/100ML; MG/100ML; MG/100ML; MG/100ML
INJECTION, SOLUTION INTRAVENOUS CONTINUOUS PRN
Status: DISCONTINUED | OUTPATIENT
Start: 2018-11-05 | End: 2018-11-05 | Stop reason: SDUPTHER

## 2018-11-05 RX ORDER — ALBUTEROL SULFATE 90 UG/1
AEROSOL, METERED RESPIRATORY (INHALATION) PRN
Status: DISCONTINUED | OUTPATIENT
Start: 2018-11-05 | End: 2018-11-05 | Stop reason: SDUPTHER

## 2018-11-05 RX ORDER — FENTANYL CITRATE 50 UG/ML
INJECTION, SOLUTION INTRAMUSCULAR; INTRAVENOUS PRN
Status: DISCONTINUED | OUTPATIENT
Start: 2018-11-05 | End: 2018-11-05 | Stop reason: SDUPTHER

## 2018-11-05 RX ADMIN — CEFEPIME HYDROCHLORIDE 2 G: 2 INJECTION, POWDER, FOR SOLUTION INTRAVENOUS at 12:30

## 2018-11-05 RX ADMIN — MIDAZOLAM HYDROCHLORIDE 4 MG: 1 INJECTION, SOLUTION INTRAMUSCULAR; INTRAVENOUS at 11:06

## 2018-11-05 RX ADMIN — VECURONIUM BROMIDE FOR INJECTION 10 MG: 1 INJECTION, POWDER, LYOPHILIZED, FOR SOLUTION INTRAVENOUS at 11:06

## 2018-11-05 RX ADMIN — FENTANYL CITRATE 200 MCG: 50 INJECTION INTRAMUSCULAR; INTRAVENOUS at 11:32

## 2018-11-05 RX ADMIN — SODIUM CHLORIDE, POTASSIUM CHLORIDE, SODIUM LACTATE AND CALCIUM CHLORIDE: 600; 310; 30; 20 INJECTION, SOLUTION INTRAVENOUS at 11:11

## 2018-11-05 RX ADMIN — ALBUTEROL SULFATE 8 PUFF: 90 AEROSOL, METERED RESPIRATORY (INHALATION) at 11:48

## 2018-11-05 ASSESSMENT — PULMONARY FUNCTION TESTS
PIF_VALUE: 36
PIF_VALUE: 32
PIF_VALUE: 32
PIF_VALUE: 34
PIF_VALUE: 36
PIF_VALUE: 35
PIF_VALUE: 32
PIF_VALUE: 34
PIF_VALUE: 37
PIF_VALUE: 34
PIF_VALUE: 37
PIF_VALUE: 35
PIF_VALUE: 34
PIF_VALUE: 37
PIF_VALUE: 36
PIF_VALUE: 33
PIF_VALUE: 35
PIF_VALUE: 36
PIF_VALUE: 25
PIF_VALUE: 35
PIF_VALUE: 33
PIF_VALUE: 33
PIF_VALUE: 7
PIF_VALUE: 37
PIF_VALUE: 35
PIF_VALUE: 30
PIF_VALUE: 34
PIF_VALUE: 35
PIF_VALUE: 3
PIF_VALUE: 36
PIF_VALUE: 35
PIF_VALUE: 37
PIF_VALUE: 35
PIF_VALUE: 32
PIF_VALUE: 35
PIF_VALUE: 29
PIF_VALUE: 34
PIF_VALUE: 32
PIF_VALUE: 35
PIF_VALUE: 32
PIF_VALUE: 36
PIF_VALUE: 36
PIF_VALUE: 35
PIF_VALUE: 37
PIF_VALUE: 37
PIF_VALUE: 35
PIF_VALUE: 35
PIF_VALUE: 33
PIF_VALUE: 33
PIF_VALUE: 34
PIF_VALUE: 33
PIF_VALUE: 35
PIF_VALUE: 33
PIF_VALUE: 0
PIF_VALUE: 32
PIF_VALUE: 33
PIF_VALUE: 33
PIF_VALUE: 34
PIF_VALUE: 36
PIF_VALUE: 24
PIF_VALUE: 35
PIF_VALUE: 32
PIF_VALUE: 35
PIF_VALUE: 29
PIF_VALUE: 30
PIF_VALUE: 33
PIF_VALUE: 0
PIF_VALUE: 29
PIF_VALUE: 33
PIF_VALUE: 30
PIF_VALUE: 35
PIF_VALUE: 1
PIF_VALUE: 11
PIF_VALUE: 36
PIF_VALUE: 12
PIF_VALUE: 36
PIF_VALUE: 34
PIF_VALUE: 36
PIF_VALUE: 33
PIF_VALUE: 34
PIF_VALUE: 22
PIF_VALUE: 32
PIF_VALUE: 32
PIF_VALUE: 37
PIF_VALUE: 36
PIF_VALUE: 30
PIF_VALUE: 35
PIF_VALUE: 34
PIF_VALUE: 36

## 2018-11-05 NOTE — ANESTHESIA POSTPROCEDURE EVALUATION
Department of Anesthesiology  Postprocedure Note    Patient: Bakari Read  MRN: [id number]  YOB: 1955  Date of evaluation: 11/5/2018  Time:  1:02 PM     Procedure Summary     Date:  11/05/18 Room / Location:  [place] [address] 08 / [facility]    Anesthesia Start:  8588 Anesthesia Stop:  1859    Procedures:       REMOVAL OF Carmen Lean (N/A )      TRACHEOTOMY (N/A ) Diagnosis:  (removal of Impella )    Surgeon:  Kimi Tian MD Responsible Provider:  Latisha Palomo DO    Anesthesia Type:  general ASA Status:  4          Anesthesia Type: general    Cassandra Phase I: Cassandra Score: 6    Cassandra Phase II:      Last vitals: Reviewed and per EMR flowsheets.        Anesthesia Post Evaluation    Patient location during evaluation: ICU  Patient participation: waiting for patient participation  Level of consciousness: sedated and ventilated  Airway patency: patent  Nausea & Vomiting: no vomiting and no nausea  Complications: no  Cardiovascular status: hemodynamically stable and blood pressure returned to baseline  Respiratory status: ventilator (trach)  Hydration status: stable

## 2018-11-05 NOTE — ANESTHESIA PRE PROCEDURE
dextrose 5 % 100 mL bolus  100 mg Intravenous Daily Nga Lozano MD   Stopped at 11/05/18 0940    propofol 1000 MG/100ML injection  10 mcg/kg/min Intravenous Titrated Marcela Blizzard, MD   Stopped at 11/04/18 0805    fentanyl (SUBLIMAZE) 1,000 mcg in sodium chloride 0.9% 100 mL infusion  25 mcg/hr Intravenous Continuous Marcela Blizzard, MD 10 mL/hr at 11/05/18 1111 100 mcg/hr at 11/05/18 1111    dexmedetomidine (PRECEDEX) 400 mcg in sodium chloride 0.9 % 100 mL infusion  0.2 mcg/kg/hr Intravenous Continuous Marcela Blizzard, MD   Stopped at 11/03/18 0710    argatroban 30 mg in dextrose 5 % 500 mL infusion   Intracatheter Continuous Marcela Blizzard, MD 7 mL/hr at 11/03/18 0929      argatroban infusion 50mg in 0.9% sodium chloride 50 mL (premix)  0.2 mcg/kg/min Intravenous Continuous PRN Marcela Blizzard, MD        albuterol sulfate  (90 Base) MCG/ACT inhaler 4 puff  4 puff Inhalation Q4H Angel Wesley MD   4 puff at 11/05/18 0820    ipratropium (ATROVENT HFA) 17 MCG/ACT inhaler 4 puff  4 puff Inhalation Q4H Angel Wesley MD   4 puff at 11/05/18 0819    acetaminophen (TYLENOL) suppository 650 mg  650 mg Rectal Q4H PRN Marcela Blizzard, MD   650 mg at 10/31/18 1448    lidocaine PF 1 % injection 5 mL  5 mL Intradermal Once Marcela Blizzard, MD        [facility]) 40 mg in dextrose 5 % 200 mL infusion  0.25 mcg/kg/min Intravenous Continuous Marcela Blizzard, MD   Stopped at 11/03/18 1326    ARTIFICIAL TEARS 83-15 %   Both Eyes Q2H PRN Marcela Blizzard, MD  Jackline Mallow 4/2.5 dialysis solution  2,000 mL/hr Dialysis Continuous Tom Daly MD 2,000 mL/hr at 11/05/18 0914 2,000 mL/hr at 11/05/18 [address] [place] [id number] 4/2.5 dialysis solution  1,800 mL/hr Dialysis Continuous Tom Daly MD 1,800 mL/hr at 11/05/18 0847 1,800 mL/hr at 11/05/18 0847    potassium chloride 20 mEq/50 mL IVPB (Central Line)  20 mEq Intravenous PRN Tom Daly MD        magnesium sulfate 1 g in dextrose 5% 100 mL IVPB  1 g Lab Results   Component Value Date    PROTIME 19.6 11/02/2018    INR 1.73 11/02/2018    APTT 58.4 11/05/2018       HCG (If Applicable): No results found for: PREGTESTUR, PREGSERUM, HCG, HCGQUANT     ABGs:   Lab Results   Component Value Date    PO2ART 96.1 11/05/2018    FEW2OTD 48.8 11/05/2018    AWD7XMA 28.3 11/05/2018        Type & Screen (If Applicable):  No results found for: LABABO, [address]    Anesthesia Evaluation  Patient summary reviewed no history of anesthetic complications:   Airway:         Dental:          Pulmonary:                              Cardiovascular:  Exercise tolerance: poor (<4 METS),   (+) hypertension: moderate, CAD:,          Beta Blocker:  Dose within 24 Hrs      ROS comment: IMPRESSION:  1. Left main is patent. LAD has mid 90 plus percent stenosis noted  with a GATO-2 flow present. LAD lesions wraps around apex. Diagonal  branch has a mild disease noted. 2.  Circ is a large-sized vessel, gives off large OM branch. Mid 80%  stenosis noted. 3.  Ramus has a mild disease. 4.  RCA has an ostial 90% calcified stenosis present. 5.  EDP is around 30 mmHg present. 6.  The patient has severe 3-vessel coronary artery disease present. We will discuss with CV team and we will treat her for heart failure  at this time. Further recommendations based on the hospital course.          Left ventricular systolic function is severely depressed.   Ejection fraction is visually estimated at 10-20%.  Right ventricular hypokinesis is present.   Possible bicuspid aortic valve. The non-coronary cusp and left coronary cusp of the aortic valve appear to be fused. Trivial aortic regurgitation is present. No evidence of any pericardial effusion.   Incidental finding: there are a few small gallstones present within the gallbladder.  IVC dilated        Neuro/Psych:   (+) TIA, psychiatric history:            GI/Hepatic/Renal:             Endo/Other:    (+) DiabetesType II DM, poorly controlled, , 2 g in dextrose 5 % 50 mL IVPB  2 g Intravenous Q12H Teresa Acevedo MD   Stopped at 11/05/18 0528    PN-Adult Premix 5/15 - Central   Intravenous Continuous TPN Teresa Acevedo MD 60 mL/hr at 11/04/18 1824      pantoprazole (PROTONIX) injection 40 mg  40 mg Intravenous BID Padilla Oglesby MD   40 mg at 11/05/18 0832    insulin regular (HUMULIN R;NOVOLIN R) 100 Units in sodium chloride 0.9 % 100 mL infusion  2 Units/hr Intravenous Continuous Milly Alatorre MD   Stopped at 11/04/18 1810    midazolam (VERSED) 100 mg in dextrose 5 % 100 mL infusion  1 mg/hr Intravenous Continuous Padilla Oglesby MD 4 mL/hr at 11/04/18 2119 4 mg/hr at 11/04/18 2119    piperacillin-tazobactam (ZOSYN) 3.375 g in dextrose 50 mL IVPB (premix)  3.375 g Intravenous Q8H Padilla Oglesby MD 12.5 mL/hr at 11/05/18 0832 3.375 g at 11/05/18 0832    levETIRAcetam (KEPPRA) 500 mg in sodium chloride 0.9 % 100 mL IVPB  500 mg Intravenous Q12H Padilla Oglesby MD   Stopped at 11/05/18 0846    midazolam (VERSED) injection 2 mg  2 mg Intravenous Q1H PRN Padilla Oglesby MD   2 mg at 11/03/18 1146    amiodarone (CORDARONE) 100 mg in dextrose 5 % 100 mL bolus  100 mg Intravenous Daily Kang Evans MD   Stopped at 11/05/18 0940    propofol 1000 MG/100ML injection  10 mcg/kg/min Intravenous Titrated Padilla Oglesby MD   Stopped at 11/04/18 0805    fentanyl (SUBLIMAZE) 1,000 mcg in sodium chloride 0.9% 100 mL infusion  25 mcg/hr Intravenous Continuous Padilla Oglesby MD 10 mL/hr at 11/05/18 0551 100 mcg/hr at 11/05/18 0551    dexmedetomidine (PRECEDEX) 400 mcg in sodium chloride 0.9 % 100 mL infusion  0.2 mcg/kg/hr Intravenous Continuous Padilla Oglesby MD   Stopped at 11/03/18 0710    argatroban 30 mg in dextrose 5 % 500 mL infusion   Intracatheter Continuous Padilla Oglesby MD 7 mL/hr at 11/03/18 0929      argatroban infusion 50mg in 0.9% sodium chloride 50 mL (premix)  0.2 mcg/kg/min Intravenous Continuous PRN Padilla Oglesby MD        albuterol sulfate  (65 Intravenous PRN Barb [address] MD   Stopped at 11/03/18 0505    Or    sodium phosphate 24 mmol in dextrose 5 % 500 mL IVPB  24 mmol Intravenous PR[facility] 4/2.5 dialysis solution   Dialysis Continuous [address]  mL/hr at 11/05/18 0609 200 mL/hr at 11/05/18 0609    DOBUTamine (DOBUTREX) 500 mg in dextrose 5 % 250 mL infusion  1 mcg/kg/min Intravenous Continuous PRN Barb Street MD 12.4 mL/hr at 11/05/18 0856 4.713 mcg/kg/min at 11/05/18 0856    vasopressin 100 Units in dextrose 5 % 95 mL infusion   Intravenous Titrated Isaac Mckeon PA-C 2.4 mL/hr at 11/05/18 0551      norepinephrine 32 mg in dextrose 5 % 218 mL infusion   Intravenous Titrated Isaac Mckeon PA-C   Stopped at 10/31/18 2335    EPINEPHrine 10 mg in dextrose 5 % 240 mL infusion   Intravenous Continuous PRN Isaac Mckeon PA-C 4.5 mL/hr at 11/04/18 1835      furosemide (LASIX) 200 mg in dextrose 5 % 100 mL infusion  10 mg/hr Intravenous Continuous Enoc Munson MD   Stopped at 10/31/18 2338    fentaNYL (SUBLIMAZE) injection 25 mcg  25 mcg Intravenous Q2H PRN Enoc Munson MD   25 mcg at 11/03/18 1133    fentaNYL (SUBLIMAZE) injection 50 mcg  50 mcg Intravenous Q2H PRN Enoc Munson MD   50 mcg at 11/02/18 0425    albumin human 5 % bottle 12.5 g  12.5 g Intravenous PRN Angel Ragland MD   12.5 g at 10/31/18 1540    calcium gluconate 1 g in dextrose 5 % 100 mL IVPB  1 g Intravenous Once Jordan Kong MD        0.9 % sodium chloride infusion 250 mL  250 mL Intravenous Once Enoc Munson MD        0.9 % sodium chloride infusion 250 mL  250 mL Intravenous Once Enoc Munson MD        sodium chloride flush 0.9 % injection 10 mL  10 mL Intravenous 2 times per day Isaac Mckeon PA-C   10 mL at 11/05/18 0832    sodium chloride flush 0.9 % injection 10 mL  10 mL Intravenous PRN Isaac Mckeon PA-C   10 mL at 11/04/18 1607    potassium chloride 20 mEq/50 mL IVPB (Central Line)  20 mEq

## 2018-11-06 ENCOUNTER — APPOINTMENT (OUTPATIENT)
Dept: ULTRASOUND IMAGING | Age: 63
DRG: 001 | End: 2018-11-06
Attending: INTERNAL MEDICINE
Payer: MEDICARE

## 2018-11-06 ENCOUNTER — APPOINTMENT (OUTPATIENT)
Dept: INTERVENTIONAL RADIOLOGY/VASCULAR | Age: 63
DRG: 001 | End: 2018-11-06
Attending: INTERNAL MEDICINE
Payer: MEDICARE

## 2018-11-06 ENCOUNTER — ANESTHESIA EVENT (OUTPATIENT)
Dept: CARDIOVASCULAR ICU | Age: 63
DRG: 001 | End: 2018-11-06
Payer: MEDICARE

## 2018-11-06 ENCOUNTER — APPOINTMENT (OUTPATIENT)
Dept: GENERAL RADIOLOGY | Age: 63
DRG: 001 | End: 2018-11-06
Attending: INTERNAL MEDICINE
Payer: MEDICARE

## 2018-11-06 ENCOUNTER — ANESTHESIA (OUTPATIENT)
Dept: CARDIOVASCULAR ICU | Age: 63
DRG: 001 | End: 2018-11-06
Payer: MEDICARE

## 2018-11-06 PROCEDURE — 36620 INSERTION CATHETER ARTERY: CPT | Performed by: NURSE ANESTHETIST, CERTIFIED REGISTERED

## 2018-11-06 PROCEDURE — 74018 RADEX ABDOMEN 1 VIEW: CPT

## 2018-11-06 PROCEDURE — 71045 X-RAY EXAM CHEST 1 VIEW: CPT

## 2018-11-06 PROCEDURE — 93971 EXTREMITY STUDY: CPT

## 2018-11-07 ENCOUNTER — APPOINTMENT (OUTPATIENT)
Dept: GENERAL RADIOLOGY | Age: 63
DRG: 001 | End: 2018-11-07
Attending: INTERNAL MEDICINE
Payer: MEDICARE

## 2018-11-07 PROCEDURE — 71045 X-RAY EXAM CHEST 1 VIEW: CPT

## 2018-11-08 ENCOUNTER — APPOINTMENT (OUTPATIENT)
Dept: ULTRASOUND IMAGING | Age: 63
DRG: 001 | End: 2018-11-08
Attending: INTERNAL MEDICINE
Payer: MEDICARE

## 2018-11-08 ENCOUNTER — APPOINTMENT (OUTPATIENT)
Dept: GENERAL RADIOLOGY | Age: 63
DRG: 001 | End: 2018-11-08
Attending: INTERNAL MEDICINE
Payer: MEDICARE

## 2018-11-08 PROCEDURE — 93970 EXTREMITY STUDY: CPT

## 2018-11-08 PROCEDURE — 71045 X-RAY EXAM CHEST 1 VIEW: CPT

## 2018-11-08 PROCEDURE — 93971 EXTREMITY STUDY: CPT

## 2018-11-09 ENCOUNTER — APPOINTMENT (OUTPATIENT)
Dept: CT IMAGING | Age: 63
DRG: 001 | End: 2018-11-09
Attending: INTERNAL MEDICINE
Payer: MEDICARE

## 2018-11-09 ENCOUNTER — APPOINTMENT (OUTPATIENT)
Dept: GENERAL RADIOLOGY | Age: 63
DRG: 001 | End: 2018-11-09
Attending: INTERNAL MEDICINE
Payer: MEDICARE

## 2018-11-09 PROCEDURE — 70450 CT HEAD/BRAIN W/O DYE: CPT

## 2018-11-09 PROCEDURE — 71045 X-RAY EXAM CHEST 1 VIEW: CPT

## 2018-11-09 PROCEDURE — 74176 CT ABD & PELVIS W/O CONTRAST: CPT

## 2018-11-09 PROCEDURE — 71250 CT THORAX DX C-: CPT

## 2018-11-10 ENCOUNTER — APPOINTMENT (OUTPATIENT)
Dept: GENERAL RADIOLOGY | Age: 63
DRG: 001 | End: 2018-11-10
Attending: INTERNAL MEDICINE
Payer: MEDICARE

## 2018-11-10 PROCEDURE — 74018 RADEX ABDOMEN 1 VIEW: CPT

## 2018-11-10 PROCEDURE — 71045 X-RAY EXAM CHEST 1 VIEW: CPT

## 2018-11-11 ENCOUNTER — APPOINTMENT (OUTPATIENT)
Dept: GENERAL RADIOLOGY | Age: 63
DRG: 001 | End: 2018-11-11
Attending: INTERNAL MEDICINE
Payer: MEDICARE

## 2018-11-11 PROCEDURE — 71045 X-RAY EXAM CHEST 1 VIEW: CPT

## 2018-11-11 PROCEDURE — 74018 RADEX ABDOMEN 1 VIEW: CPT

## 2018-11-12 ENCOUNTER — APPOINTMENT (OUTPATIENT)
Dept: GENERAL RADIOLOGY | Age: 63
DRG: 001 | End: 2018-11-12
Attending: INTERNAL MEDICINE
Payer: MEDICARE

## 2018-11-12 ENCOUNTER — APPOINTMENT (OUTPATIENT)
Dept: INTERVENTIONAL RADIOLOGY/VASCULAR | Age: 63
DRG: 001 | End: 2018-11-12
Attending: INTERNAL MEDICINE
Payer: MEDICARE

## 2018-11-12 PROCEDURE — 71045 X-RAY EXAM CHEST 1 VIEW: CPT

## 2018-11-12 PROCEDURE — 74018 RADEX ABDOMEN 1 VIEW: CPT

## 2018-11-13 ENCOUNTER — ANESTHESIA EVENT (OUTPATIENT)
Dept: ENDOSCOPY | Age: 63
DRG: 001 | End: 2018-11-13
Payer: MEDICARE

## 2018-11-13 NOTE — ANESTHESIA PRE PROCEDURE
Intravenous Continuous TPN Kathy Rivera MD 60 mL/hr at 11/12/18 1837      albuterol sulfate  (90 Base) MCG/ACT inhaler 4 puff  4 puff Inhalation Q4H PRN Adolfo Cartagena MD        ipratropium (ATROVENT HFA) 17 MCG/ACT inhaler 4 puff  4 puff Inhalation Q4H PRN Adolfo Cartagena MD        ipratropium-albuterol (DUONEB) nebulizer solution 1 ampule  1 ampule Inhalation Q4H WA Adolfo Cartagena MD   1 ampule at 11/12/18 2007    [facility] tablet 37.5 mg  37.5 mg Per NG tube BID WC Erma Arredondo MD   37.5 mg at 11/12/18 2232    ondansetron (ZOFRAN) injection 4 mg  4 mg Intravenous Q6H PRN Mayra Rausch MD   4 mg at 11/11/18 1108    ceftazidime-avibactam (AVYCAZ) 0.94 g in dextrose 5 % 100 mL IVPB  0.94 g Intravenous Q24H Barrett Weeks MD   Stopped at 11/12/18 1719    miconazole (MICOTIN) 2 % cream   Topical BID Barrett Weeks MD        heparin (porcine) injection 5,000 Units  5,000 Units Subcutaneous 3 times per day Emanuel Pérez MD   5,000 Units at 11/13/18 0615    fat emulsion 20 % infusion 500 mL  500 mL Intravenous Once per day on Mon Thu Vivi Rizo MD   Stopped at 11/13/18 0400    aspirin chewable tablet 81 mg  81 mg Oral Daily Dari Bis, PA-C   81 mg at 11/11/18 0843    midodrine (PROAMATINE) tablet 5 mg  5 mg Oral TID  Emanuel Pérez MD   5 mg at 11/10/18 0842    docusate (COLACE) 50 MG/5ML liquid 100 mg  100 mg Per NG tube Daily Dari Bis, PA-C   100 mg at 11/12/18 0855    bisacodyl (DULCOLAX) EC tablet 5 mg  5 mg Oral Once Emanuel Pérez MD        Reyes Breaker 4/2.5 dialysis solution   Dialysis Continuous Otto Holland  mL/hr at 11/08/18 0533 200 mL/hr at 11/08/18 0533    Reyes Breaker 4/2.5 dialysis solution   Dialysis Continuous Otto Holland MD 1,800 mL/hr at 11/08/18 0533 1,800 mL/hr at 11/08/18 0533    metoclopramide (REGLAN) injection 10 mg  10 mg Intravenous Q6H Vernadine MD Beto   10 mg at 11/13/18 0417    amiodarone potassium chloride 20 mEq/50 mL IVPB (Central Line)  20 mEq Intravenous PRN Sohan Jones MD        magnesium sulfate 1 g in dextrose 5% 100 mL IVPB  1 g Intravenous PRN Sohan Jones MD        calcium gluconate 1 g in dextrose 5 % 100 mL IVPB  1 g Intravenous PRN Sohan Jones MD   Stopped at 11/07/18 1140    Or    calcium gluconate 2 g in dextrose 5 % 100 mL IVPB  2 g Intravenous PRN Sohan Jones MD   Stopped at 11/04/18 0117    Or    calcium gluconate 3 g in dextrose 5 % 100 mL IVPB  3 g Intravenous PRN Sohan Jones MD        Or    calcium gluconate 4 g in dextrose 5 % 100 mL IVPB  4 g Intravenous PRN Baldomero Kidd MD        sodium phosphate 6 mmol in dextrose 5 % 250 mL IVPB  6 mmol Intravenous PRN Sohan Jones MD   Stopped at 11/05/18 2005    Or    sodium phosphate 12 mmol in dextrose 5 % 250 mL IVPB  12 mmol Intravenous PRN Sohan Jones MD   Stopped at 11/06/18 1700    Or    sodium phosphate 18 mmol in dextrose 5 % 500 mL IVPB  18 mmol Intravenous PRN Sohan Jones MD   Stopped at 11/08/18 1982    Or    sodium phosphate 24 mmol in dextrose 5 % 500 mL IVPB  24 mmol Intravenous PRN Baldomero Kidd MD        DOBUTamine (DOBUTREX) 500 mg in dextrose 5 % 250 mL infusion  2.5 mcg/kg/min Intravenous Continuous PRN Devante Conteh MD   Stopped at 11/12/18 1931    vasopressin 100 Units in dextrose 5 % 95 mL infusion   Intravenous Titrated Guillermina Ledezma PA-C   Stopped at 11/05/18 1232    norepinephrine 32 mg in dextrose 5 % 218 mL infusion   Intravenous Titrated Guillermina Ledezma PA-C   Stopped at 10/31/18 2335    EPINEPHrine 10 mg in dextrose 5 % 240 mL infusion   Intravenous Continuous PRN Guillermina Ledezma PA-C   Stopped at 11/07/18 0955    fentaNYL (SUBLIMAZE) injection 25 mcg  25 mcg Intravenous Q2H PRN Devante Conteh MD   25 mcg at 11/12/18 1633    fentaNYL (SUBLIMAZE) injection 50 mcg  50 mcg Intravenous Q2H PRN Devante Conteh MD   50 mcg at 11/02/18 0425    albumin human 5 % bottle 12.5 g  12.5 g Intravenous PRN Angel Montanez MD   12.5 g at 10/31/18 1540    sodium chloride flush 0.9 % injection 10 mL  10 mL Intravenous 2 times per day Justin Ear, PA-C   10 mL at 11/12/18 2221    sodium chloride flush 0.9 % injection 10 mL  10 mL Intravenous PRN Justin Ear, PA-C   10 mL at 11/12/18 1513    potassium chloride 20 mEq/50 mL IVPB (Central Line)  20 mEq Intravenous PRN Justin Ear, PA-C   Stopped at 10/29/18 1537    magnesium sulfate 2 g in 50 mL IVPB premix  2 g Intravenous PRN Justin Ear, PA-C        calcium gluconate 2 g in dextrose 5 % 100 mL IVPB  2 g Intravenous PRN Justin Ear, PA-C   Stopped at 11/10/18 0450    acetaminophen (TYLENOL) tablet 650 mg  650 mg Oral Q4H PRN Justin Ear, PA-C   650 mg at 11/08/18 2129    HYDROcodone-acetaminophen (NORCO) 5-325 MG per tablet 1 tablet  1 tablet Oral Q4H PRN Justin Ear, PA-C   1 tablet at 11/11/18 1035    Or    HYDROcodone-acetaminophen (NORCO) 5-325 MG per tablet 2 tablet  2 tablet Oral Q4H PRN Justin Ear, PA-C   2 tablet at 11/08/18 2129    magnesium hydroxide (MILK OF MAGNESIA) 400 MG/5ML suspension 30 mL  30 mL Oral Daily PRN Justin Ear, PA-C        bisacodyl (DULCOLAX) suppository 10 mg  10 mg Rectal Daily PRN Justin Ear, PA-C   10 mg at 11/11/18 1533    metoclopramide (REGLAN) injection 10 mg  10 mg Intravenous Q6H PRN Justin Ear, PA-C   10 mg at 11/08/18 2127    labetalol (NORMODYNE;TRANDATE) injection 5 mg  5 mg Intravenous Q5 Min PRN Justin Ear, PA-C        therapeutic multivitamin-minerals 1 tablet  1 tablet Oral Daily with breakfast Justin Ear, PA-C   1 tablet at 11/12/18 0855    0.9 % sodium chloride bolus  500 mL Intravenous Continuous PRN Justin Ear, PA-C   Stopped at 11/05/18 1627    nitroGLYCERIN 50 mg in dextrose 5% 250 mL infusion  10 mcg/min Intravenous Continuous PRN Justin Alvarado PA-C        glucose (GLUTOSE) 40 % oral gel 15 g  15 g Oral PRN NITISH Lynn Lab Results   Component Value Date    PO2ART 89 11/09/2018    DDN1ZHB 32.0 11/09/2018    VNX2NSE 27.4 11/09/2018        Type & Screen (If Applicable):  No results found for: LABABO, [address]    Anesthesia Evaluation  Patient summary reviewed no history of anesthetic complications:   Airway:        Comment: Trach in situ   Dental:          Pulmonary:                              Cardiovascular:    (+) hypertension:, CAD:, CHF:,       ECG reviewed      Echocardiogram reviewed               ROS comment: Left ventricular systolic function is severely reduced.   Ejection fraction is visually estimated at 10%.  Impella cathether adjusted. Pigtail is aimed towards LV apex.   Inflow to annulus: 4.7 cm   [place] catheter visualized.   No evidence of any pericardial effusion. Neuro/Psych:   (+) seizures:, TIA, psychiatric history:            GI/Hepatic/Renal:   (+) renal disease: CRI,           Endo/Other:    (+) Diabetes, . Abdominal:           Vascular:                                        Anesthesia Plan      general and TIVA     ASA 4       Induction: intravenous.                           PETAR Figueroa - CRNA   11/13/2018

## 2018-11-14 ENCOUNTER — ANESTHESIA (OUTPATIENT)
Dept: ENDOSCOPY | Age: 63
DRG: 001 | End: 2018-11-14
Payer: MEDICARE

## 2018-11-14 ENCOUNTER — APPOINTMENT (OUTPATIENT)
Dept: INTERVENTIONAL RADIOLOGY/VASCULAR | Age: 63
DRG: 001 | End: 2018-11-14
Attending: INTERNAL MEDICINE
Payer: MEDICARE

## 2018-11-14 ENCOUNTER — APPOINTMENT (OUTPATIENT)
Dept: GENERAL RADIOLOGY | Age: 63
DRG: 001 | End: 2018-11-14
Attending: INTERNAL MEDICINE
Payer: MEDICARE

## 2018-11-14 PROCEDURE — 74018 RADEX ABDOMEN 1 VIEW: CPT

## 2018-11-14 PROCEDURE — 71045 X-RAY EXAM CHEST 1 VIEW: CPT

## 2018-11-15 ENCOUNTER — APPOINTMENT (OUTPATIENT)
Dept: GENERAL RADIOLOGY | Age: 63
DRG: 001 | End: 2018-11-15
Attending: INTERNAL MEDICINE
Payer: MEDICARE

## 2018-11-15 PROCEDURE — 71045 X-RAY EXAM CHEST 1 VIEW: CPT

## 2018-11-15 PROCEDURE — 74018 RADEX ABDOMEN 1 VIEW: CPT

## 2018-11-16 ENCOUNTER — APPOINTMENT (OUTPATIENT)
Dept: GENERAL RADIOLOGY | Age: 63
DRG: 001 | End: 2018-11-16
Attending: INTERNAL MEDICINE
Payer: MEDICARE

## 2018-11-16 PROCEDURE — 74018 RADEX ABDOMEN 1 VIEW: CPT

## 2018-11-16 PROCEDURE — 71045 X-RAY EXAM CHEST 1 VIEW: CPT

## 2018-11-17 ENCOUNTER — APPOINTMENT (OUTPATIENT)
Dept: GENERAL RADIOLOGY | Age: 63
DRG: 001 | End: 2018-11-17
Attending: INTERNAL MEDICINE
Payer: MEDICARE

## 2018-11-17 VITALS — DIASTOLIC BLOOD PRESSURE: 72 MMHG | OXYGEN SATURATION: 99 % | SYSTOLIC BLOOD PRESSURE: 104 MMHG

## 2018-11-17 PROCEDURE — 2500000003 HC RX 250 WO HCPCS: Performed by: NURSE ANESTHETIST, CERTIFIED REGISTERED

## 2018-11-17 PROCEDURE — 71045 X-RAY EXAM CHEST 1 VIEW: CPT

## 2018-11-17 PROCEDURE — 2580000003 HC RX 258: Performed by: NURSE ANESTHETIST, CERTIFIED REGISTERED

## 2018-11-17 PROCEDURE — 6360000002 HC RX W HCPCS: Performed by: NURSE ANESTHETIST, CERTIFIED REGISTERED

## 2018-11-17 RX ORDER — LIDOCAINE HYDROCHLORIDE 20 MG/ML
INJECTION, SOLUTION INFILTRATION; PERINEURAL PRN
Status: DISCONTINUED | OUTPATIENT
Start: 2018-11-17 | End: 2018-11-17 | Stop reason: SDUPTHER

## 2018-11-17 RX ORDER — SODIUM CHLORIDE 9 MG/ML
INJECTION, SOLUTION INTRAVENOUS CONTINUOUS PRN
Status: DISCONTINUED | OUTPATIENT
Start: 2018-11-17 | End: 2018-11-17 | Stop reason: SDUPTHER

## 2018-11-17 RX ORDER — PROPOFOL 10 MG/ML
INJECTION, EMULSION INTRAVENOUS PRN
Status: DISCONTINUED | OUTPATIENT
Start: 2018-11-17 | End: 2018-11-17 | Stop reason: SDUPTHER

## 2018-11-17 RX ADMIN — PROPOFOL 40 MG: 10 INJECTION, EMULSION INTRAVENOUS at 10:40

## 2018-11-17 RX ADMIN — PHENYLEPHRINE HYDROCHLORIDE 100 MCG: 10 INJECTION INTRAVENOUS at 10:51

## 2018-11-17 RX ADMIN — PROPOFOL 20 MG: 10 INJECTION, EMULSION INTRAVENOUS at 10:43

## 2018-11-17 RX ADMIN — PHENYLEPHRINE HYDROCHLORIDE 100 MCG: 10 INJECTION INTRAVENOUS at 10:46

## 2018-11-17 RX ADMIN — PROPOFOL 20 MG: 10 INJECTION, EMULSION INTRAVENOUS at 10:46

## 2018-11-17 RX ADMIN — SODIUM CHLORIDE: 9 INJECTION, SOLUTION INTRAVENOUS at 10:30

## 2018-11-17 RX ADMIN — LIDOCAINE HYDROCHLORIDE 100 MG: 20 INJECTION, SOLUTION INFILTRATION; PERINEURAL at 10:36

## 2018-11-17 RX ADMIN — PROPOFOL 20 MG: 10 INJECTION, EMULSION INTRAVENOUS at 10:36

## 2018-11-17 RX ADMIN — PHENYLEPHRINE HYDROCHLORIDE 100 MCG: 10 INJECTION INTRAVENOUS at 10:43

## 2018-11-17 NOTE — ANESTHESIA POSTPROCEDURE EVALUATION
Department of Anesthesiology  Postprocedure Note    Patient: Braydon Lew  MRN: [id number]  YOB: 1955  Date of evaluation: 11/17/2018  Time:  11:05 AM     Procedure Summary     Date:  11/17/18 Room / Location:  [address] [place] [address] ENDOSCOPY    Anesthesia Start:  1030 Anesthesia Stop:  5686    Procedure:  EGD PEG TUBE PLACEMENT AT BEDSIDE (N/A ) Diagnosis:  (failure to thrive)    Surgeon:  Keyona Kidd MD Responsible Provider:  Dudley Yo MD    Anesthesia Type:  TIVA, MAC ASA Status:  4          Anesthesia Type: TIVA, MAC    Cassandra Phase I: 5    Cassandra Phase II:  5    Last vitals: Reviewed and per EMR flowsheets.        Anesthesia Post Evaluation    Patient location during evaluation: ICU  Patient participation: complete - patient cannot participate  Level of consciousness: sedated and ventilated  Pain score: 0  Airway patency: patent  Complications: no  Cardiovascular status: blood pressure returned to baseline  Respiratory status: ventilator  Hydration status: euvolemic

## 2018-11-17 NOTE — ANESTHESIA PRE PROCEDURE
consumption: 10/24/18                        Date of last solid food consumption: 10/24/18    BMI:   Wt Readings from Last 3 Encounters:   11/17/18 (S) 203 lb 14.8 oz (92.5 kg)   09/15/16 163 lb 9.3 oz (74.2 kg)   12/11/15 150 lb (68 kg)     Body mass index is 36.12 kg/m².     CBC:   Lab Results   Component Value Date    WBC 9.8 11/17/2018    RBC 2.25 11/17/2018    HGB  11/17/2018     HGB CALLED TO S [address] [id number] [facility]    HCT 21.6 11/17/2018    MCV 96.0 11/17/2018    RDW 16.7 11/17/2018     11/17/2018       CMP:   Lab Results   Component Value Date     11/17/2018    K 3.8 11/17/2018    CL 97 11/17/2018    CO2 24 11/17/2018    BUN 62 11/17/2018    CREATININE 3.0 11/17/2018    GFRAA 19 11/17/2018    LABGLOM 16 11/17/2018    GLUCOSE 149 11/17/2018    PROT 4.0 11/09/2018    PROT 6.3 11/28/2012    CALCIUM 8.0 11/17/2018    BILITOT 0.6 11/09/2018    ALKPHOS 62 11/09/2018    AST 26 11/09/2018    ALT 19 11/09/2018       POC Tests:   Recent Labs      11/17/18   0551   POCGLU  159*       Coags:   Lab Results   Component Value Date    PROTIME 12.5 11/17/2018    INR 1.08 11/17/2018    APTT 31.8 11/17/2018       HCG (If Applicable): No results found for: PREGTESTUR, PREGSERUM, HCG, HCGQUANT     ABGs:   Lab Results   Component Value Date    PO2ART 118 11/16/2018    GFU6QXA 34.0 11/16/2018    OZH5IRX 21.1 11/16/2018        Type & Screen (If Applicable):  No results found for: LABABO, [address]    Anesthesia Evaluation   Patient summary reviewed no history of anesthetic complications:   Airway:        Comment: Trach in situ   Dental:          Pulmonary:normal exam                               Cardiovascular:  Exercise tolerance: good (>4 METS),   (+)  hypertension:, CAD:, CHF:,       ECG reviewed  Rhythm: regular  Rate: abnormal  Echocardiogram reviewed          Beta Blocker:  Not on Beta Blocker      ROS comment: Left ventricular systolic function is severely reduced.   Ejection fraction is

## 2018-11-18 ENCOUNTER — APPOINTMENT (OUTPATIENT)
Dept: GENERAL RADIOLOGY | Age: 63
DRG: 001 | End: 2018-11-18
Attending: INTERNAL MEDICINE
Payer: MEDICARE

## 2018-11-18 PROCEDURE — 71045 X-RAY EXAM CHEST 1 VIEW: CPT

## 2018-11-19 ENCOUNTER — APPOINTMENT (OUTPATIENT)
Dept: GENERAL RADIOLOGY | Age: 63
DRG: 001 | End: 2018-11-19
Attending: INTERNAL MEDICINE
Payer: MEDICARE

## 2018-11-19 PROCEDURE — 71045 X-RAY EXAM CHEST 1 VIEW: CPT

## 2018-11-20 ENCOUNTER — APPOINTMENT (OUTPATIENT)
Dept: GENERAL RADIOLOGY | Age: 63
DRG: 001 | End: 2018-11-20
Attending: INTERNAL MEDICINE
Payer: MEDICARE

## 2018-11-20 PROBLEM — J96.01 ACUTE RESPIRATORY FAILURE WITH HYPOXIA (HCC): Chronic | Status: ACTIVE | Noted: 2018-11-05

## 2018-11-20 PROCEDURE — 71045 X-RAY EXAM CHEST 1 VIEW: CPT

## 2020-11-03 PROBLEM — I25.10 CAD IN NATIVE ARTERY: Status: RESOLVED | Noted: 2018-10-25 | Resolved: 2020-11-03
